# Patient Record
Sex: MALE | Race: OTHER | HISPANIC OR LATINO | URBAN - NONMETROPOLITAN AREA
[De-identification: names, ages, dates, MRNs, and addresses within clinical notes are randomized per-mention and may not be internally consistent; named-entity substitution may affect disease eponyms.]

---

## 2018-07-11 ENCOUNTER — OFFICE VISIT (OUTPATIENT)
Dept: MEDICAL GROUP | Facility: PHYSICIAN GROUP | Age: 23
End: 2018-07-11
Payer: COMMERCIAL

## 2018-07-11 VITALS
TEMPERATURE: 98.4 F | HEIGHT: 68 IN | WEIGHT: 140 LBS | HEART RATE: 68 BPM | RESPIRATION RATE: 16 BRPM | DIASTOLIC BLOOD PRESSURE: 60 MMHG | OXYGEN SATURATION: 99 % | BODY MASS INDEX: 21.22 KG/M2 | SYSTOLIC BLOOD PRESSURE: 100 MMHG

## 2018-07-11 DIAGNOSIS — Z09 NEED FOR IMMUNIZATION FOLLOW-UP: ICD-10-CM

## 2018-07-11 DIAGNOSIS — Z28.39 NOT UP TO DATE WITH SCHEDULED IMMUNIZATIONS: ICD-10-CM

## 2018-07-11 DIAGNOSIS — Z11.3 SCREEN FOR STD (SEXUALLY TRANSMITTED DISEASE): ICD-10-CM

## 2018-07-11 PROCEDURE — 90621 MENB-FHBP VACC 2/3 DOSE IM: CPT | Performed by: NURSE PRACTITIONER

## 2018-07-11 PROCEDURE — 90716 VAR VACCINE LIVE SUBQ: CPT | Performed by: NURSE PRACTITIONER

## 2018-07-11 PROCEDURE — 90734 MENACWYD/MENACWYCRM VACC IM: CPT | Performed by: NURSE PRACTITIONER

## 2018-07-11 PROCEDURE — 90471 IMMUNIZATION ADMIN: CPT | Performed by: NURSE PRACTITIONER

## 2018-07-11 PROCEDURE — 90651 9VHPV VACCINE 2/3 DOSE IM: CPT | Performed by: NURSE PRACTITIONER

## 2018-07-11 PROCEDURE — 99214 OFFICE O/P EST MOD 30 MIN: CPT | Mod: 25 | Performed by: NURSE PRACTITIONER

## 2018-07-11 PROCEDURE — 90472 IMMUNIZATION ADMIN EACH ADD: CPT | Performed by: NURSE PRACTITIONER

## 2018-07-11 ASSESSMENT — PATIENT HEALTH QUESTIONNAIRE - PHQ9: CLINICAL INTERPRETATION OF PHQ2 SCORE: 0

## 2018-07-11 NOTE — ASSESSMENT & PLAN NOTE
22-year-old male patient presents today to update immunizations as he will be starting college in the fall.  He will be attending [employer], majoring in criminal justice.  It appears he is due for both of the meningococcal vaccines as well as second dose of varicella, he is also interested in starting the HPV series.  Four immunizations were given today.  He was advised to check with his college on whether or not he will need a PPD, if so he can schedule MA visit for this.  I did advise him that he will need boosters for the HPV and Menactra.

## 2018-07-11 NOTE — PROGRESS NOTES
Chief Complaint   Patient presents with   • Annual Exam     vaccines needed for college-menactra, varicella, HPV, Trumenba         This is a 22 y.o.male patient that presents today with the following: Immunizations, STI check    Not up to date with scheduled immunizations  22-year-old male patient presents today to update immunizations as he will be starting college in the fall.  He will be attending [employer], majoring in criminal justice.  It appears he is due for both of the meningococcal vaccines as well as second dose of varicella, he is also interested in starting the HPV series.  Four immunizations were given today.  He was advised to check with his college on whether or not he will need a PPD, if so he can schedule MA visit for this.  I did advise him that he will need boosters for the HPV and Menactra.    Screen for STD (sexually transmitted disease)  Patient would like to be screened for all STI's, he is a new relationship.  He does deny symptoms, he reports that he just wants to be cautious.  Labs have been ordered, he can have these done in the next couple of days.      No visits with results within 1 Month(s) from this visit.   Latest known visit with results is:   No results found for any previous visit.         clinical course has been stable    History reviewed. No pertinent past medical history.    History reviewed. No pertinent surgical history.    History reviewed. No pertinent family history.    Patient has no known allergies.    No current [facility]-ordered outpatient prescriptions on file.     No current [facility]-ordered facility-administered medications on file.        Constitutional ROS: No unexpected change in weight, No weakness, No unexplained fevers, sweats, or chills  Pulmonary ROS: No chronic cough, sputum, or hemoptysis, No shortness of breath, No recent change in breathing  Cardiovascular ROS: No chest pain, No edema, No palpitations  Gastrointestinal ROS: No abdominal pain, No nausea, vomiting,  "diarrhea, or constipation  Musculoskeletal/Extremities ROS: No clubbing, No peripheral edema, No pain, redness or swelling on the joints  Neurologic ROS: Normal development, No seizures, No weakness  Genitourinary ROS: Positive per HPI    Physical exam:  /60   Pulse 68   Temp 36.9 °C (98.4 °F)   Resp 16   Ht 1.715 m (5' 7.5\")   Wt 63.5 kg (140 lb)   SpO2 99%   BMI 21.60 kg/m²   General Appearance: Young male, alert, no distress, well-nourished, well-groomed  Skin: Skin color, texture, turgor normal. No rashes or lesions.  Lungs: negative findings: normal respiratory rate and rhythm, lungs clear to auscultation  Heart: negative. RRR without murmur, gallop, or rubs.  No ectopy.  Abdomen: Abdomen soft, non-tender. BS normal. No masses,  No organomegaly  Musculoskeletal: negative findings: ROM of all joints is normal, no evidence of joint instability, strength normal, no deformities present  Neurologic: intact, CN II through XII grossly intact    Medical decision making/discussion: Patient was given immunizations today.    Jin was seen today for annual exam.    Diagnoses and all orders for this visit:    Not up to date with scheduled immunizations    Screen for STD (sexually transmitted disease)  -     CHLAMYDIA/GC PCR URINE OR SWAB; Future  -     HIV AG/AB COMBO ASSAY SCREENING; Future  -     HEP C VIRUS ANTIBODY; Future  -     RPR (SYPHILIS); Future    Need for immunization follow-up  -     MENINGOCOCCAL CONJUGATE VACCINE 4-VALENT IM  -     9VHPV VACCINE 2-3 DOSE IM  -     MENING VAC SERO B 2-3 DOSE SCHED IM  -     VARICELLA VACCINE SQ          Please note that this dictation was created using voice recognition software. I have made every reasonable attempt to correct obvious errors, but I expect that there are errors of grammar and possibly content that I did not discover before finalizing the note.        "

## 2018-07-11 NOTE — ASSESSMENT & PLAN NOTE
Patient would like to be screened for all STI's, he is a new relationship.  He does deny symptoms, he reports that he just wants to be cautious.  Labs have been ordered, he can have these done in the next couple of days.

## 2018-08-17 ENCOUNTER — HOSPITAL ENCOUNTER (OUTPATIENT)
Dept: LAB | Facility: MEDICAL CENTER | Age: 23
End: 2018-08-17
Attending: NURSE PRACTITIONER
Payer: COMMERCIAL

## 2018-08-17 DIAGNOSIS — Z11.3 SCREEN FOR STD (SEXUALLY TRANSMITTED DISEASE): ICD-10-CM

## 2018-08-17 LAB
C TRACH DNA SPEC QL NAA+PROBE: NEGATIVE
HCV AB SER QL: NEGATIVE
HIV 1+2 AB+HIV1 P24 AG SERPL QL IA: NON REACTIVE
N GONORRHOEA DNA SPEC QL NAA+PROBE: NEGATIVE
SPECIMEN SOURCE: NORMAL
TREPONEMA PALLIDUM IGG+IGM AB [PRESENCE] IN SERUM OR PLASMA BY IMMUNOASSAY: NON REACTIVE

## 2018-08-17 PROCEDURE — 87591 N.GONORRHOEAE DNA AMP PROB: CPT

## 2018-08-17 PROCEDURE — 86803 HEPATITIS C AB TEST: CPT

## 2018-08-17 PROCEDURE — 86780 TREPONEMA PALLIDUM: CPT

## 2018-08-17 PROCEDURE — 87491 CHLMYD TRACH DNA AMP PROBE: CPT

## 2018-08-17 PROCEDURE — 36415 COLL VENOUS BLD VENIPUNCTURE: CPT

## 2018-08-17 PROCEDURE — 87389 HIV-1 AG W/HIV-1&-2 AB AG IA: CPT
